# Patient Record
Sex: FEMALE
[De-identification: names, ages, dates, MRNs, and addresses within clinical notes are randomized per-mention and may not be internally consistent; named-entity substitution may affect disease eponyms.]

---

## 2020-04-26 ENCOUNTER — MESSAGE (OUTPATIENT)
Age: 25
End: 2020-04-26

## 2020-05-02 LAB
SARS-COV-2 IGG SERPL IA-ACNC: <0.1 INDEX
SARS-COV-2 IGG SERPL QL IA: NEGATIVE

## 2020-05-08 ENCOUNTER — APPOINTMENT (OUTPATIENT)
Dept: DISASTER EMERGENCY | Facility: HOSPITAL | Age: 25
End: 2020-05-08

## 2021-05-30 ENCOUNTER — TRANSCRIPTION ENCOUNTER (OUTPATIENT)
Age: 26
End: 2021-05-30

## 2021-06-12 ENCOUNTER — TRANSCRIPTION ENCOUNTER (OUTPATIENT)
Age: 26
End: 2021-06-12

## 2024-05-10 PROBLEM — Z00.00 ENCOUNTER FOR PREVENTIVE HEALTH EXAMINATION: Status: ACTIVE | Noted: 2024-05-10

## 2024-05-30 ENCOUNTER — APPOINTMENT (OUTPATIENT)
Dept: OBGYN | Facility: CLINIC | Age: 29
End: 2024-05-30
Payer: COMMERCIAL

## 2024-05-30 ENCOUNTER — RESULT REVIEW (OUTPATIENT)
Age: 29
End: 2024-05-30

## 2024-05-30 PROCEDURE — 96127 BRIEF EMOTIONAL/BEHAV ASSMT: CPT

## 2024-05-30 PROCEDURE — 36415 COLL VENOUS BLD VENIPUNCTURE: CPT

## 2024-05-30 PROCEDURE — 99204 OFFICE O/P NEW MOD 45 MIN: CPT | Mod: 25

## 2024-05-30 PROCEDURE — 76817 TRANSVAGINAL US OBSTETRIC: CPT

## 2024-06-17 ENCOUNTER — APPOINTMENT (OUTPATIENT)
Dept: OBGYN | Facility: CLINIC | Age: 29
End: 2024-06-17

## 2024-06-18 ENCOUNTER — APPOINTMENT (OUTPATIENT)
Dept: OBGYN | Facility: CLINIC | Age: 29
End: 2024-06-18
Payer: COMMERCIAL

## 2024-06-18 PROCEDURE — 0502F SUBSEQUENT PRENATAL CARE: CPT

## 2024-06-18 PROCEDURE — 76817 TRANSVAGINAL US OBSTETRIC: CPT

## 2024-06-18 PROCEDURE — 36415 COLL VENOUS BLD VENIPUNCTURE: CPT

## 2024-06-19 ENCOUNTER — TRANSCRIPTION ENCOUNTER (OUTPATIENT)
Age: 29
End: 2024-06-19

## 2024-07-03 ENCOUNTER — APPOINTMENT (OUTPATIENT)
Dept: OBGYN | Facility: CLINIC | Age: 29
End: 2024-07-03

## 2024-07-09 ENCOUNTER — APPOINTMENT (OUTPATIENT)
Dept: OBGYN | Facility: CLINIC | Age: 29
End: 2024-07-09
Payer: COMMERCIAL

## 2024-07-09 PROCEDURE — 76813 OB US NUCHAL MEAS 1 GEST: CPT

## 2024-07-09 PROCEDURE — 0502F SUBSEQUENT PRENATAL CARE: CPT

## 2024-07-09 PROCEDURE — 36415 COLL VENOUS BLD VENIPUNCTURE: CPT

## 2024-08-01 ENCOUNTER — APPOINTMENT (OUTPATIENT)
Dept: OBGYN | Facility: CLINIC | Age: 29
End: 2024-08-01
Payer: COMMERCIAL

## 2024-08-01 PROCEDURE — 0502F SUBSEQUENT PRENATAL CARE: CPT

## 2024-08-19 ENCOUNTER — APPOINTMENT (OUTPATIENT)
Dept: ANTEPARTUM | Facility: CLINIC | Age: 29
End: 2024-08-19

## 2024-08-19 ENCOUNTER — ASOB RESULT (OUTPATIENT)
Age: 29
End: 2024-08-19

## 2024-08-19 PROCEDURE — 76811 OB US DETAILED SNGL FETUS: CPT

## 2024-08-21 ENCOUNTER — APPOINTMENT (OUTPATIENT)
Dept: OBGYN | Facility: CLINIC | Age: 29
End: 2024-08-21
Payer: COMMERCIAL

## 2024-08-21 PROCEDURE — 0502F SUBSEQUENT PRENATAL CARE: CPT

## 2024-10-02 ENCOUNTER — APPOINTMENT (OUTPATIENT)
Dept: OBGYN | Facility: CLINIC | Age: 29
End: 2024-10-02
Payer: COMMERCIAL

## 2024-10-02 PROCEDURE — 0502F SUBSEQUENT PRENATAL CARE: CPT

## 2024-10-24 ENCOUNTER — APPOINTMENT (OUTPATIENT)
Dept: OBGYN | Facility: CLINIC | Age: 29
End: 2024-10-24
Payer: COMMERCIAL

## 2024-10-24 PROCEDURE — 76819 FETAL BIOPHYS PROFIL W/O NST: CPT | Mod: 59

## 2024-10-24 PROCEDURE — 0502F SUBSEQUENT PRENATAL CARE: CPT

## 2024-10-24 PROCEDURE — 90715 TDAP VACCINE 7 YRS/> IM: CPT

## 2024-10-24 PROCEDURE — 36415 COLL VENOUS BLD VENIPUNCTURE: CPT

## 2024-10-24 PROCEDURE — 76816 OB US FOLLOW-UP PER FETUS: CPT

## 2024-10-24 PROCEDURE — 90471 IMMUNIZATION ADMIN: CPT

## 2024-11-21 ENCOUNTER — APPOINTMENT (OUTPATIENT)
Dept: OBGYN | Facility: CLINIC | Age: 29
End: 2024-11-21
Payer: COMMERCIAL

## 2024-11-21 PROCEDURE — 0502F SUBSEQUENT PRENATAL CARE: CPT

## 2024-11-21 PROCEDURE — 76819 FETAL BIOPHYS PROFIL W/O NST: CPT | Mod: 59

## 2024-11-21 PROCEDURE — 76816 OB US FOLLOW-UP PER FETUS: CPT

## 2024-12-05 ENCOUNTER — APPOINTMENT (OUTPATIENT)
Dept: OBGYN | Facility: CLINIC | Age: 29
End: 2024-12-05
Payer: COMMERCIAL

## 2024-12-05 PROCEDURE — 0502F SUBSEQUENT PRENATAL CARE: CPT

## 2024-12-18 ENCOUNTER — APPOINTMENT (OUTPATIENT)
Dept: OBGYN | Facility: CLINIC | Age: 29
End: 2024-12-18
Payer: COMMERCIAL

## 2024-12-18 PROCEDURE — 0502F SUBSEQUENT PRENATAL CARE: CPT

## 2024-12-18 PROCEDURE — 90678 RSV VACC PREF BIVALENT IM: CPT

## 2024-12-18 PROCEDURE — 90471 IMMUNIZATION ADMIN: CPT

## 2024-12-30 ENCOUNTER — APPOINTMENT (OUTPATIENT)
Dept: OBGYN | Facility: CLINIC | Age: 29
End: 2024-12-30
Payer: COMMERCIAL

## 2024-12-30 PROCEDURE — 0502F SUBSEQUENT PRENATAL CARE: CPT

## 2024-12-30 PROCEDURE — 76816 OB US FOLLOW-UP PER FETUS: CPT

## 2024-12-30 PROCEDURE — 76819 FETAL BIOPHYS PROFIL W/O NST: CPT | Mod: 59

## 2025-01-07 ENCOUNTER — APPOINTMENT (OUTPATIENT)
Dept: OBGYN | Facility: CLINIC | Age: 30
End: 2025-01-07
Payer: COMMERCIAL

## 2025-01-07 PROCEDURE — 0502F SUBSEQUENT PRENATAL CARE: CPT

## 2025-01-08 ENCOUNTER — INPATIENT (INPATIENT)
Facility: HOSPITAL | Age: 30
LOS: 3 days | Discharge: HOME | DRG: 951 | End: 2025-01-12
Attending: OBSTETRICS & GYNECOLOGY | Admitting: OBSTETRICS & GYNECOLOGY
Payer: COMMERCIAL

## 2025-01-08 VITALS
WEIGHT: 121.92 LBS | HEIGHT: 63 IN | DIASTOLIC BLOOD PRESSURE: 75 MMHG | HEART RATE: 99 BPM | SYSTOLIC BLOOD PRESSURE: 129 MMHG | OXYGEN SATURATION: 96 % | TEMPERATURE: 98 F | RESPIRATION RATE: 17 BRPM

## 2025-01-08 DIAGNOSIS — Z33.1 PREGNANT STATE, INCIDENTAL: ICD-10-CM

## 2025-01-08 DIAGNOSIS — Z3A.39 39 WEEKS GESTATION OF PREGNANCY: ICD-10-CM

## 2025-01-08 LAB
BASOPHILS # BLD AUTO: 0.02 K/UL — SIGNIFICANT CHANGE UP (ref 0–0.2)
BASOPHILS NFR BLD AUTO: 0.2 % — SIGNIFICANT CHANGE UP (ref 0–2)
BLD GP AB SCN SERPL QL: NEGATIVE — SIGNIFICANT CHANGE UP
EOSINOPHIL # BLD AUTO: 0.05 K/UL — SIGNIFICANT CHANGE UP (ref 0–0.5)
EOSINOPHIL NFR BLD AUTO: 0.6 % — SIGNIFICANT CHANGE UP (ref 0–6)
HCT VFR BLD CALC: 37.7 % — SIGNIFICANT CHANGE UP (ref 34.5–45)
HGB BLD-MCNC: 12.6 G/DL — SIGNIFICANT CHANGE UP (ref 11.5–15.5)
IMM GRANULOCYTES NFR BLD AUTO: 0.6 % — SIGNIFICANT CHANGE UP (ref 0–0.9)
LYMPHOCYTES # BLD AUTO: 1.51 K/UL — SIGNIFICANT CHANGE UP (ref 1–3.3)
LYMPHOCYTES # BLD AUTO: 18.6 % — SIGNIFICANT CHANGE UP (ref 13–44)
MCHC RBC-ENTMCNC: 31.2 PG — SIGNIFICANT CHANGE UP (ref 27–34)
MCHC RBC-ENTMCNC: 33.4 G/DL — SIGNIFICANT CHANGE UP (ref 32–36)
MCV RBC AUTO: 93.3 FL — SIGNIFICANT CHANGE UP (ref 80–100)
MONOCYTES # BLD AUTO: 0.72 K/UL — SIGNIFICANT CHANGE UP (ref 0–0.9)
MONOCYTES NFR BLD AUTO: 8.9 % — SIGNIFICANT CHANGE UP (ref 2–14)
NEUTROPHILS # BLD AUTO: 5.78 K/UL — SIGNIFICANT CHANGE UP (ref 1.8–7.4)
NEUTROPHILS NFR BLD AUTO: 71.1 % — SIGNIFICANT CHANGE UP (ref 43–77)
NRBC # BLD: 0 /100 WBCS — SIGNIFICANT CHANGE UP (ref 0–0)
PLATELET # BLD AUTO: 167 K/UL — SIGNIFICANT CHANGE UP (ref 150–400)
RBC # BLD: 4.04 M/UL — SIGNIFICANT CHANGE UP (ref 3.8–5.2)
RBC # FLD: 13.4 % — SIGNIFICANT CHANGE UP (ref 10.3–14.5)
RH IG SCN BLD-IMP: POSITIVE — SIGNIFICANT CHANGE UP
RH IG SCN BLD-IMP: POSITIVE — SIGNIFICANT CHANGE UP
WBC # BLD: 8.13 K/UL — SIGNIFICANT CHANGE UP (ref 3.8–10.5)
WBC # FLD AUTO: 8.13 K/UL — SIGNIFICANT CHANGE UP (ref 3.8–10.5)

## 2025-01-08 RX ORDER — CHLORHEXIDINE GLUCONATE 1.2 MG/ML
1 RINSE ORAL DAILY
Refills: 0 | Status: DISCONTINUED | OUTPATIENT
Start: 2025-01-08 | End: 2025-01-10

## 2025-01-08 RX ORDER — PNV/FERROUS FUM/DOCUSATE/FOLIC 90-50-1MG
1 TABLET, EXTENDED RELEASE ORAL
Refills: 0 | DISCHARGE

## 2025-01-08 RX ORDER — OXYTOCIN IN 5 % DEXTROSE 20/500ML
167 PLASTIC BAG, INJECTION (ML) INTRAVENOUS
Qty: 30 | Refills: 0 | Status: DISCONTINUED | OUTPATIENT
Start: 2025-01-08 | End: 2025-01-12

## 2025-01-08 RX ORDER — SODIUM CHLORIDE 9 MG/ML
1000 INJECTION, SOLUTION INTRAVENOUS
Refills: 0 | Status: DISCONTINUED | OUTPATIENT
Start: 2025-01-08 | End: 2025-01-10

## 2025-01-08 RX ORDER — CITRIC ACID/SODIUM CITRATE 334-500MG
15 SOLUTION, ORAL ORAL EVERY 6 HOURS
Refills: 0 | Status: DISCONTINUED | OUTPATIENT
Start: 2025-01-08 | End: 2025-01-10

## 2025-01-08 NOTE — OB PROVIDER H&P - HISTORY OF PRESENT ILLNESS
PA Admission H&P    Subjective  HPI: yoF GP gestational age presents for _. +FM. -LOF. -CTXs. -VB. Pt denies any other concerns.    – PNC: Denies prenatal issues. GBS _.  EFW _g by sono.  – OBHx:   – GynHx: denies  – PMH: denies  – PSH: denies  – Psych: denies   – Social: denies   – Meds: PNV   – Allergies: NKDA  – Will accept blood transfusions? Yes    Objective  – VS  T(C): 36.8 (01-08-25 @ 18:41)  HR: 87 (01-08-25 @ 19:10)  BP: 129/75 (01-08-25 @ 18:41)  RR: 17 (01-08-25 @ 18:41)  SpO2: 93% (01-08-25 @ 19:10)  – PE:   CV: RRR  Pulm: breathing comfortably on RA  Abd: gravid, nontender  Extr: moving all extremities with ease  – FS:   – Spec: pooling, nitrazine, ferning, bleeding,  (lesions if patient with HSV2 history)  – VE: //  – FHT: baseline 1, mod variability, +accels, -decels  – [place]: qmin  – EFW: _g by sono  – Sono: vertex    Assessment  yoF GP gestational age presents for _.     Plan  1. Admit to LND. Routine Labs. IVF.  2. Expectant management/IOL w/  3. Fetus: cat 1 tracing. VTX. EFW _g by sono. Continuous EFM. Sono. No concerns.  4. Prenatal issues: none  5. GBS _  6. Pain: IV pain meds/epidural PRN    Plan per Dr. Alma Palma PAAlemC PA Admission H&P    Subjective  HPI: 29F  39.4wks gestational age presents for a scheduled eIOL. +FM. -LOF. -CTXs. -VB. Pt denies chest pain, palpitations, SOB, or any other concerns.    – PNC: Denies prenatal issues. GBS negative. EFW 3650g extrapolated from sono in office 1 week ago.   – OBHx: Primigravida. Denies terminations, miscarriages, ectopic pregnancies.  – GynHx: Denies fibroids, ovarian cysts, abnormal pap smears, STI  – PMH: Denies asthma, thyroid disorders, renal/liver disease, bleeding/clotting disorders  – PSH: Denies  – Psych: Denies anxiety, depression  – Social: Denies T/E/D  – Meds: PNV   – Allergies: NKDA  – Will accept blood transfusions? Yes

## 2025-01-08 NOTE — OB PROVIDER H&P - NSHPPHYSICALEXAM_GEN_ALL_CORE
Objective  – VS  T(C): 36.8 (01-08-25 @ 18:41)  HR: 87 (01-08-25 @ 19:10)  BP: 129/75 (01-08-25 @ 18:41)  RR: 17 (01-08-25 @ 18:41)  SpO2: 93% (01-08-25 @ 19:10)  – PE:   General: NAD  CV: RRR  Pulm: breathing comfortably on RA, clear to auscultation b/l  Abd: gravid, nontender  Extr: moving all extremities with ease, nonedematous, non-TTP of b/l LE  – VE: 1/50/-3  – FHT: 135/moderate variability/+accels/-decels  – [place]: infrequent contractions  – EFW: 3650g extrapolated from sono in office 1 week ago  – Sono: vertex

## 2025-01-08 NOTE — OB RN PATIENT PROFILE - NS_ISOLATION_OBGYN_ALL_OB
Chonodrocutaneous Helical Advancement Flap Text: The defect edges were debeveled with a #15 scalpel blade.  Given the location of the defect and the proximity to free margins a chondrocutaneous helical advancement flap was deemed most appropriate.  Using a sterile surgical marker, the appropriate advancement flap was drawn incorporating the defect and placing the expected incisions within the relaxed skin tension lines where possible.    The area thus outlined was incised deep to adipose tissue with a #15 scalpel blade.  The skin margins were undermined to an appropriate distance in all directions utilizing iris scissors. None

## 2025-01-08 NOTE — OB PROVIDER H&P - NS_OBGYNHISTORY_OBGYN_ALL_OB_FT
denies – PNC: Denies prenatal issues. GBS negative. EFW 3650g extrapolated from sono in office 1 week ago.   – OBHx: Primigravida. Denies terminations, miscarriages, ectopic pregnancies.  – GynHx: Denies fibroids, ovarian cysts, abnormal pap smears, STI

## 2025-01-08 NOTE — OB PROVIDER H&P - NSLOWPPHRISK_OBGYN_A_OB
No previous uterine incision/[place] Pregnancy/Less than or equal to 4 previous vaginal births/No known bleeding disorder

## 2025-01-08 NOTE — OB PROVIDER H&P - PROBLEM SELECTOR PLAN 1
Plan  1. Admit to L&D. Routine Labs. IVF.  2. IOL with PO cytotec and cervical balloon  3. Fetus: cat 1 tracing. VTX. EFW 3650g extrapolated from sono in office 1 week ago. Continuous EFM. Sono. No concerns.  4. Prenatal issues: none  5. GBS negative  6. Pain: IV pain meds/epidural PRN    Plan per Dr. Alma Palma PA-C

## 2025-01-09 ENCOUNTER — TRANSCRIPTION ENCOUNTER (OUTPATIENT)
Age: 30
End: 2025-01-09

## 2025-01-09 LAB — T PALLIDUM AB TITR SER: NEGATIVE — SIGNIFICANT CHANGE UP

## 2025-01-09 PROCEDURE — 59510 CESAREAN DELIVERY: CPT

## 2025-01-09 DEVICE — INTERCEED 3 X 4": Type: IMPLANTABLE DEVICE | Status: FUNCTIONAL

## 2025-01-09 RX ORDER — METHYLERGONOVINE MALEATE 0.2 MG/1
0.2 TABLET ORAL EVERY 4 HOURS
Refills: 0 | Status: COMPLETED | OUTPATIENT
Start: 2025-01-09 | End: 2025-01-10

## 2025-01-09 RX ORDER — OXYCODONE HCL 15 MG
5 TABLET ORAL
Refills: 0 | Status: DISCONTINUED | OUTPATIENT
Start: 2025-01-10 | End: 2025-01-12

## 2025-01-09 RX ORDER — PIPERACILLIN AND TAZOBACTAM 3; .375 G/15ML; G/15ML
4.5 INJECTION, POWDER, LYOPHILIZED, FOR SOLUTION INTRAVENOUS EVERY 8 HOURS
Refills: 0 | Status: COMPLETED | OUTPATIENT
Start: 2025-01-09 | End: 2025-01-10

## 2025-01-09 RX ORDER — OXYCODONE HCL 15 MG
10 TABLET ORAL
Refills: 0 | Status: DISCONTINUED | OUTPATIENT
Start: 2025-01-10 | End: 2025-01-10

## 2025-01-09 RX ORDER — HEPARIN SODIUM 1000 [USP'U]/ML
5000 INJECTION, SOLUTION INTRAVENOUS; SUBCUTANEOUS EVERY 12 HOURS
Refills: 0 | Status: DISCONTINUED | OUTPATIENT
Start: 2025-01-10 | End: 2025-01-12

## 2025-01-09 RX ORDER — SODIUM CHLORIDE 9 MG/ML
1000 INJECTION, SOLUTION INTRAVENOUS
Refills: 0 | Status: DISCONTINUED | OUTPATIENT
Start: 2025-01-10 | End: 2025-01-12

## 2025-01-09 RX ORDER — LANOLIN
1 OINTMENT (GRAM) TOPICAL EVERY 6 HOURS
Refills: 0 | Status: DISCONTINUED | OUTPATIENT
Start: 2025-01-10 | End: 2025-01-12

## 2025-01-09 RX ORDER — ACETAMINOPHEN 80 MG/.8ML
975 SOLUTION/ DROPS ORAL
Refills: 0 | Status: DISCONTINUED | OUTPATIENT
Start: 2025-01-10 | End: 2025-01-12

## 2025-01-09 RX ORDER — DIPHENHYDRAMINE HCL 25 MG
25 TABLET ORAL EVERY 6 HOURS
Refills: 0 | Status: DISCONTINUED | OUTPATIENT
Start: 2025-01-10 | End: 2025-01-12

## 2025-01-09 RX ORDER — SIMETHICONE 125 MG/1
80 CAPSULE, LIQUID FILLED ORAL EVERY 4 HOURS
Refills: 0 | Status: DISCONTINUED | OUTPATIENT
Start: 2025-01-10 | End: 2025-01-12

## 2025-01-09 RX ORDER — DIPHENOXYLATE HYDROCHLORIDE AND ATROPINE SULFATE 2.5; .025 MG/1; MG/1
2 TABLET ORAL ONCE
Refills: 0 | Status: DISCONTINUED | OUTPATIENT
Start: 2025-01-09 | End: 2025-01-09

## 2025-01-09 RX ORDER — DEXAMETHASONE SODIUM PHOSPHATE 4 MG/ML
4 VIAL (ML) INJECTION EVERY 6 HOURS
Refills: 0 | Status: DISCONTINUED | OUTPATIENT
Start: 2025-01-10 | End: 2025-01-10

## 2025-01-09 RX ORDER — NALOXONE HCL 0.4 MG/ML
0.1 VIAL (ML) INJECTION
Refills: 0 | Status: DISCONTINUED | OUTPATIENT
Start: 2025-01-10 | End: 2025-01-10

## 2025-01-09 RX ORDER — KETOROLAC TROMETHAMINE 30 MG/ML
30 INJECTION INTRAMUSCULAR; INTRAVENOUS EVERY 6 HOURS
Refills: 0 | Status: DISCONTINUED | OUTPATIENT
Start: 2025-01-10 | End: 2025-01-11

## 2025-01-09 RX ORDER — HYDROMORPHONE HCL 4 MG
0.25 TABLET ORAL
Refills: 0 | Status: DISCONTINUED | OUTPATIENT
Start: 2025-01-10 | End: 2025-01-12

## 2025-01-09 RX ORDER — OXYCODONE HCL 15 MG
5 TABLET ORAL
Refills: 0 | Status: DISCONTINUED | OUTPATIENT
Start: 2025-01-10 | End: 2025-01-10

## 2025-01-09 RX ORDER — OXYTOCIN IN 5 % DEXTROSE 20/500ML
4 PLASTIC BAG, INJECTION (ML) INTRAVENOUS
Qty: 30 | Refills: 0 | Status: DISCONTINUED | OUTPATIENT
Start: 2025-01-09 | End: 2025-01-12

## 2025-01-09 RX ORDER — OXYCODONE HCL 15 MG
5 TABLET ORAL ONCE
Refills: 0 | Status: DISCONTINUED | OUTPATIENT
Start: 2025-01-10 | End: 2025-01-12

## 2025-01-09 RX ORDER — ACETAMINOPHEN 80 MG/.8ML
1000 SOLUTION/ DROPS ORAL ONCE
Refills: 0 | Status: COMPLETED | OUTPATIENT
Start: 2025-01-09 | End: 2025-01-09

## 2025-01-09 RX ORDER — PIPERACILLIN AND TAZOBACTAM 3; .375 G/15ML; G/15ML
4.5 INJECTION, POWDER, LYOPHILIZED, FOR SOLUTION INTRAVENOUS ONCE
Refills: 0 | Status: COMPLETED | OUTPATIENT
Start: 2025-01-09 | End: 2025-01-09

## 2025-01-09 RX ORDER — NALBUPHINE HYDROCHLORIDE 20 MG/ML
2.5 INJECTION INTRAMUSCULAR; INTRAVENOUS; SUBCUTANEOUS EVERY 6 HOURS
Refills: 0 | Status: DISCONTINUED | OUTPATIENT
Start: 2025-01-10 | End: 2025-01-10

## 2025-01-09 RX ORDER — ONDANSETRON 4 MG/1
4 TABLET ORAL EVERY 6 HOURS
Refills: 0 | Status: DISCONTINUED | OUTPATIENT
Start: 2025-01-10 | End: 2025-01-10

## 2025-01-09 RX ORDER — OXYTOCIN IN 5 % DEXTROSE 20/500ML
42 PLASTIC BAG, INJECTION (ML) INTRAVENOUS
Qty: 30 | Refills: 0 | Status: DISCONTINUED | OUTPATIENT
Start: 2025-01-10 | End: 2025-01-12

## 2025-01-09 RX ORDER — CLOSTRIDIUM TETANI TOXOID ANTIGEN (FORMALDEHYDE INACTIVATED), CORYNEBACTERIUM DIPHTHERIAE TOXOID ANTIGEN (FORMALDEHYDE INACTIVATED), BORDETELLA PERTUSSIS TOXOID ANTIGEN (GLUTARALDEHYDE INACTIVATED), BORDETELLA PERTUSSIS FILAMENTOUS HEMAGGLUTININ ANTIGEN (FORMALDEHYDE INACTIVATED), BORDETELLA PERTUSSIS PERTACTIN ANTIGEN, AND BORDETELLA PERTUSSIS FIMBRIAE 2/3 ANTIGEN 5; 2; 2.5; 5; 3; 5 [LF]/.5ML; [LF]/.5ML; UG/.5ML; UG/.5ML; UG/.5ML; UG/.5ML
0.5 INJECTION, SUSPENSION INTRAMUSCULAR ONCE
Refills: 0 | Status: DISCONTINUED | OUTPATIENT
Start: 2025-01-10 | End: 2025-01-12

## 2025-01-09 RX ORDER — ONDANSETRON 4 MG/1
4 TABLET ORAL ONCE
Refills: 0 | Status: DISCONTINUED | OUTPATIENT
Start: 2025-01-10 | End: 2025-01-12

## 2025-01-09 RX ORDER — MORPHINE SULFATE 15 MG
0.1 TABLET, EXTENDED RELEASE ORAL ONCE
Refills: 0 | Status: DISCONTINUED | OUTPATIENT
Start: 2025-01-10 | End: 2025-01-10

## 2025-01-09 RX ORDER — IBUPROFEN 200 MG
600 TABLET ORAL EVERY 6 HOURS
Refills: 0 | Status: COMPLETED | OUTPATIENT
Start: 2025-01-10 | End: 2025-12-09

## 2025-01-09 RX ORDER — MAGNESIUM HYDROXIDE 400 MG/5ML
30 SUSPENSION, ORAL (FINAL DOSE FORM) ORAL
Refills: 0 | Status: DISCONTINUED | OUTPATIENT
Start: 2025-01-10 | End: 2025-01-12

## 2025-01-09 RX ADMIN — DIPHENOXYLATE HYDROCHLORIDE AND ATROPINE SULFATE 2 TABLET(S): 2.5; .025 TABLET ORAL at 23:32

## 2025-01-09 RX ADMIN — Medication 4 MILLIUNIT(S)/MIN: at 04:07

## 2025-01-09 RX ADMIN — PIPERACILLIN AND TAZOBACTAM 200 GRAM(S): 3; .375 INJECTION, POWDER, LYOPHILIZED, FOR SOLUTION INTRAVENOUS at 21:34

## 2025-01-09 RX ADMIN — ACETAMINOPHEN 1000 MILLIGRAM(S): 80 SOLUTION/ DROPS ORAL at 23:30

## 2025-01-09 RX ADMIN — ACETAMINOPHEN 400 MILLIGRAM(S): 80 SOLUTION/ DROPS ORAL at 21:15

## 2025-01-09 NOTE — OB RN INTRAOPERATIVE NOTE - NSSELHIDDEN_OBGYN_ALL_OB_FT
[NS_DeliveryAttending1_OBGYN_ALL_OB_FT:[id number]],[NS_DeliveryAssist1_OBGYN_ALL_OB_FT:[id number]],[NS_DeliveryRN_OBGYN_ALL_OB_FT:[id number]]

## 2025-01-09 NOTE — OB RN INTRAOPERATIVE NOTE - NS_NEWBORNRN1_OBGYN_ALL_OB_FT
Jordon White RN clear to auscultation bilaterally/respirations non-labored/breath sounds equal/good air movement/airway patent

## 2025-01-09 NOTE — OB PROVIDER LABOR PROGRESS NOTE - NSVAGINALEXAM_OBGYN_ALL_OB_DT
09-Jan-2025 18:15
09-Jan-2025 00:38
09-Jan-2025 07:36
09-Jan-2025 09:15
09-Jan-2025 17:02
09-Jan-2025 14:03
09-Jan-2025 19:41
09-Jan-2025 21:45

## 2025-01-09 NOTE — OB PROVIDER DELIVERY SUMMARY - NSPROVIDERDELIVERYNOTE_OBGYN_ALL_OB_FT
pLTCS for arrest of descent in the setting of category 2 tracing and chorioamnionitis  - Spinal done prior to  for appropriate analgesia     Liveborn female infant in OP presentation. APGARS 7/9  Grossly normal uterus, bilateral tubes, and ovaries  Hysterotomy closed in x2 layers w/ 1-0 PDS   - Right-sided extension noted  - Interceed placed over hysterotomy  Rectus muscle reapproximated w/ 2-0 Vicryl w/ horizontal mattress sutures   Fascia reapproximated w/ 0-0 Vicryl in a running fashion   SubQ reapproximated w/ 2-0 Vicryl in an interrupted fashion   Skin reapproximated w/ Quill  in a subcuticular fashion     969/1800/300  - Uterine atony addressed w/ IM Methergine and intrauterine Hemabate  - For x24hrs of Zosyn post-op and a Methergine series     Dictation #: pLTCS for category 2 tracing and chorioamnionitis  - Spinal done prior to  for appropriate analgesia     Liveborn female infant in OP presentation. APGARS 7/9  Grossly normal uterus, bilateral tubes, and ovaries  Hysterotomy closed in x2 layers w/ 1-0 PDS   - Right-sided extension noted  - Interceed placed over hysterotomy  Rectus muscle reapproximated w/ 2-0 Vicryl w/ horizontal mattress sutures   Fascia reapproximated w/ 0-0 Vicryl in a running fashion   SubQ reapproximated w/ 2-0 Vicryl in an interrupted fashion   Skin reapproximated w/ Quill  in a subcuticular fashion     969/1800/300  - Uterine atony addressed w/ IM Methergine and intrauterine Hemabate  - For x24hrs of Zosyn post-op and a Methergine series     Dictation #: pLTCS for category 2 tracing and chorioamnionitis  - Spinal done prior to  for appropriate analgesia     Liveborn female infant in OP presentation. APGARS 7/9  Grossly normal uterus, bilateral tubes, and ovaries  Hysterotomy closed in x2 layers w/ 1-0 PDS   - Right-sided extension noted  - Interceed placed over hysterotomy  Rectus muscle reapproximated w/ 2-0 Vicryl w/ horizontal mattress sutures   Fascia reapproximated w/ 0-0 Vicryl in a running fashion   SubQ reapproximated w/ 2-0 Vicryl in an interrupted fashion   Skin reapproximated w/ Quill  in a subcuticular fashion     969/1800/300  - Uterine atony addressed w/ IM Methergine and intrauterine Hemabate  - For x24hrs of Zosyn post-op and a Methergine series     Dictation #: [id number]

## 2025-01-09 NOTE — OB PROVIDER LABOR PROGRESS NOTE - NS_OBIHIFHRDETAILS_OBGYN_ALL_OB_FT
cat 1
cat 2 with fetal tachycardia and variable decels
CAT 1
140/mod/(-)accels/(-)decels
cat 1

## 2025-01-09 NOTE — OB RN INTRAOPERATIVE NOTE - NS_ADDITIONALPROCINFO1_OBGYN_ALL_OB_FT
QBL= 969ml as per triton in OR A  Urine= 300ml of bloody urine noted prior to leaving OR A    As per MD Keller, zosyn to continue for 24 hours and methergine series to be ordered. Monitor pt bleeding. No further interventions ordered. Nursing cares continue.

## 2025-01-09 NOTE — OB PROVIDER LABOR PROGRESS NOTE - ASSESSMENT
AROM clear  decrease pitocin --- tachysystole  kvng roberts md
To D/C Pitocin  If Ctx space out, to restart Pitocin  BOWI intact  Anticipate vaginal delivery    JHONY Kwan
Unchanged exam  Will try peanut ball again  Increase pitocin to 3u    alejandra levin
Plan to start pushing soon  Continue peanut ball    alejandra levin
A/P:   28yo  @ 39w5d presenting for elective induction     #Labor   - on PO Misoprostol  - Cervical ripening balloon placed. Patient tolerated the procedure well. Vaginal and uterine balloons were filled with 60cc of normal Saline each     #Fetal Wellbeing   - Cat 1    Teodoro Mcmanus, PGY-3    plan per Dr. Keller 
Continue pitocin  Patient to sit in high [place] roberts md
Patient with maternal fever of 38.5 and fetal tachycardia consistent with diagnosis of intramniotic infection/chorioamnionitis  Decision made to treat with zosyn and tylenol  Patient had been pushing for 1 hour with minimal descent of head  Given cat 2 tracing, chorioamnionitis, and minimal descent, decision made to proceed with primary   All rb/a discussed    alejandra levin
Increase pitocin  continue peanut ball  making slow progress    alejandra levin

## 2025-01-09 NOTE — OB PROVIDER DELIVERY SUMMARY - NSSELHIDDEN_OBGYN_ALL_OB_FT
[NS_DeliveryAttending1_OBGYN_ALL_OB_FT:[id number]],[NS_DeliveryAssist1_OBGYN_ALL_OB_FT:[id number]=]

## 2025-01-09 NOTE — OB PROVIDER LABOR PROGRESS NOTE - NS_STATION_OBGYN_ALL_OB_NU
-1
FREDDY completed. Pt reports that she does not drink \"daily\", but when she does, she consumes \"5-6 glasses of hard liquor\". Last drink was \"yesterday\", pt not able to specify time.   
-1
-2
0
-3
-3

## 2025-01-09 NOTE — OB NEONATOLOGY/PEDIATRICIAN DELIVERY SUMMARY - NSPEDSNEONOTESA_OBGYN_ALL_OB_FT
Called to OR for Cat 2 tracing failed IOL 39.5 wk female born via Primary CS on  at 2226     to a 28 y/o   blood type O+ mother. No significant maternal or prenatal history. PNL as follows: HIV -, Hep B - Hep C -, RPR NR, Rubella I, GBS - on  AROM at 0907 with clear fluid. Baby emerged  with pallor and poor respiratory effort was warmed, dried,deep suctioned with 10 [place]ish cath orally and nasally down left nares unable to pass down right nares given CPAP x 3 minutes  and stimulated with excellent response APGARS of 7/9. Mom plans to initiate breastfeeding. Consents to  Hep B vaccine   Highest maternal temp 38.5 received Zosyn at 2134.EOS 0.55. Called to OR for Cat 2 tracing failed IOL 39.5 wk female born via Primary CS on  at 2226     to a 32 y/o   blood type O+ mother. No significant maternal or prenatal history. PNL as follows: HIV -, Hep B - Hep C -, RPR NR, Rubella I, GBS - on  AROM at 0907 with clear fluid. Baby emerged  with pallor and poor respiratory effort was warmed, dried,deep suctioned with 10 Amharic cath orally and nasally down left nares unable to pass down right nares given CPAP x 3 minutes  and stimulated with excellent response APGARS of 7/9. Mom plans to initiate breastfeeding. Consents to  Hep B vaccine   Highest maternal temp 38.5 received Zosyn at 2134.EOS 1.57 Blood culture drawn cbcd will be sent at 6hol

## 2025-01-09 NOTE — OB PROVIDER LABOR PROGRESS NOTE - NS_SUBJECTIVE/OBJECTIVE_OBGYN_ALL_OB_FT
Labor & Delivery Progress Note     Pt examined @ 0038 for cervical balloon placement     T(C): 36.6 (25 @ 23:35), Max: 36.8 (25 @ 18:37)  HR: 59 (25 @ 00:50) (59 - 107)  BP: 119/70 (25 @ 00:16) (118/60 - 139/70)  RR: 17 (25 @ 23:09) (17 - 17)  SpO2: 94% (25 @ 00:50) (91% - 99%)      A/P:   29y G*P* at *** admitted for ***    #Labor   - s/p *** currently recieving ***for induction/augment     #Fetal Wellbeing   - Cat     # Issues     #Pain Control   - Epidural or IV PRN    Teodoro Mcmanus, PGY-2    d/w 
Pt examined for progress
Pt. comfortable on Pitocin @ 2 mu/min.
Fetus became tachycardic  Maternal temperature taken and it was found to be 38.5
Pt examined for progress

## 2025-01-09 NOTE — OB PROVIDER LABOR PROGRESS NOTE - NS_OBIHICONTRACTIONPATTERNDETAILS_OBGYN_ALL_OB_FT
every 2-3 min
q5min
2-3 min
every 2-3min
every 2 min
recent onsent of tachysystole

## 2025-01-10 LAB
ANISOCYTOSIS BLD QL: SLIGHT — SIGNIFICANT CHANGE UP
BASOPHILS # BLD AUTO: 0 K/UL — SIGNIFICANT CHANGE UP (ref 0–0.2)
BASOPHILS NFR BLD AUTO: 0 % — SIGNIFICANT CHANGE UP (ref 0–2)
DACRYOCYTES BLD QL SMEAR: SLIGHT — SIGNIFICANT CHANGE UP
EOSINOPHIL # BLD AUTO: 0 K/UL — SIGNIFICANT CHANGE UP (ref 0–0.5)
EOSINOPHIL NFR BLD AUTO: 0 % — SIGNIFICANT CHANGE UP (ref 0–6)
HCT VFR BLD CALC: 31.8 % — LOW (ref 34.5–45)
HGB BLD-MCNC: 10.4 G/DL — LOW (ref 11.5–15.5)
LYMPHOCYTES # BLD AUTO: 0.21 K/UL — LOW (ref 1–3.3)
LYMPHOCYTES # BLD AUTO: 0.9 % — LOW (ref 13–44)
MACROCYTES BLD QL: SLIGHT — SIGNIFICANT CHANGE UP
MANUAL SMEAR VERIFICATION: SIGNIFICANT CHANGE UP
MCHC RBC-ENTMCNC: 31 PG — SIGNIFICANT CHANGE UP (ref 27–34)
MCHC RBC-ENTMCNC: 32.7 G/DL — SIGNIFICANT CHANGE UP (ref 32–36)
MCV RBC AUTO: 94.6 FL — SIGNIFICANT CHANGE UP (ref 80–100)
METAMYELOCYTES # FLD: 4.4 % — HIGH (ref 0–0)
MONOCYTES # BLD AUTO: 1.02 K/UL — HIGH (ref 0–0.9)
MONOCYTES NFR BLD AUTO: 4.4 % — SIGNIFICANT CHANGE UP (ref 2–14)
NEUTROPHILS # BLD AUTO: 20.85 K/UL — HIGH (ref 1.8–7.4)
NEUTROPHILS NFR BLD AUTO: 73.5 % — SIGNIFICANT CHANGE UP (ref 43–77)
NEUTS BAND # BLD: 16.8 % — HIGH (ref 0–8)
OVALOCYTES BLD QL SMEAR: SLIGHT — SIGNIFICANT CHANGE UP
PLAT MORPH BLD: ABNORMAL
PLATELET # BLD AUTO: 124 K/UL — LOW (ref 150–400)
POIKILOCYTOSIS BLD QL AUTO: SLIGHT — SIGNIFICANT CHANGE UP
POLYCHROMASIA BLD QL SMEAR: SLIGHT — SIGNIFICANT CHANGE UP
RBC # BLD: 3.36 M/UL — LOW (ref 3.8–5.2)
RBC # FLD: 13.5 % — SIGNIFICANT CHANGE UP (ref 10.3–14.5)
RBC BLD AUTO: ABNORMAL
WBC # BLD: 23.09 K/UL — HIGH (ref 3.8–10.5)
WBC # FLD AUTO: 23.09 K/UL — HIGH (ref 3.8–10.5)

## 2025-01-10 RX ADMIN — ACETAMINOPHEN 975 MILLIGRAM(S): 80 SOLUTION/ DROPS ORAL at 20:49

## 2025-01-10 RX ADMIN — ACETAMINOPHEN 975 MILLIGRAM(S): 80 SOLUTION/ DROPS ORAL at 03:15

## 2025-01-10 RX ADMIN — HEPARIN SODIUM 5000 UNIT(S): 1000 INJECTION, SOLUTION INTRAVENOUS; SUBCUTANEOUS at 18:00

## 2025-01-10 RX ADMIN — ACETAMINOPHEN 975 MILLIGRAM(S): 80 SOLUTION/ DROPS ORAL at 21:25

## 2025-01-10 RX ADMIN — ACETAMINOPHEN 975 MILLIGRAM(S): 80 SOLUTION/ DROPS ORAL at 15:30

## 2025-01-10 RX ADMIN — ACETAMINOPHEN 975 MILLIGRAM(S): 80 SOLUTION/ DROPS ORAL at 09:22

## 2025-01-10 RX ADMIN — METHYLERGONOVINE MALEATE 0.2 MILLIGRAM(S): 0.2 TABLET ORAL at 02:21

## 2025-01-10 RX ADMIN — ACETAMINOPHEN 975 MILLIGRAM(S): 80 SOLUTION/ DROPS ORAL at 04:15

## 2025-01-10 RX ADMIN — KETOROLAC TROMETHAMINE 30 MILLIGRAM(S): 30 INJECTION INTRAMUSCULAR; INTRAVENOUS at 06:34

## 2025-01-10 RX ADMIN — METHYLERGONOVINE MALEATE 0.2 MILLIGRAM(S): 0.2 TABLET ORAL at 10:23

## 2025-01-10 RX ADMIN — HEPARIN SODIUM 5000 UNIT(S): 1000 INJECTION, SOLUTION INTRAVENOUS; SUBCUTANEOUS at 06:35

## 2025-01-10 RX ADMIN — KETOROLAC TROMETHAMINE 30 MILLIGRAM(S): 30 INJECTION INTRAMUSCULAR; INTRAVENOUS at 13:30

## 2025-01-10 RX ADMIN — PIPERACILLIN AND TAZOBACTAM 200 GRAM(S): 3; .375 INJECTION, POWDER, LYOPHILIZED, FOR SOLUTION INTRAVENOUS at 06:35

## 2025-01-10 RX ADMIN — PIPERACILLIN AND TAZOBACTAM 200 GRAM(S): 3; .375 INJECTION, POWDER, LYOPHILIZED, FOR SOLUTION INTRAVENOUS at 21:50

## 2025-01-10 RX ADMIN — METHYLERGONOVINE MALEATE 0.2 MILLIGRAM(S): 0.2 TABLET ORAL at 14:35

## 2025-01-10 RX ADMIN — METHYLERGONOVINE MALEATE 0.2 MILLIGRAM(S): 0.2 TABLET ORAL at 06:34

## 2025-01-10 RX ADMIN — KETOROLAC TROMETHAMINE 30 MILLIGRAM(S): 30 INJECTION INTRAMUSCULAR; INTRAVENOUS at 18:00

## 2025-01-10 RX ADMIN — KETOROLAC TROMETHAMINE 30 MILLIGRAM(S): 30 INJECTION INTRAMUSCULAR; INTRAVENOUS at 12:31

## 2025-01-10 RX ADMIN — ACETAMINOPHEN 975 MILLIGRAM(S): 80 SOLUTION/ DROPS ORAL at 10:00

## 2025-01-10 RX ADMIN — PIPERACILLIN AND TAZOBACTAM 200 GRAM(S): 3; .375 INJECTION, POWDER, LYOPHILIZED, FOR SOLUTION INTRAVENOUS at 14:35

## 2025-01-10 RX ADMIN — METHYLERGONOVINE MALEATE 0.2 MILLIGRAM(S): 0.2 TABLET ORAL at 18:00

## 2025-01-10 RX ADMIN — ACETAMINOPHEN 975 MILLIGRAM(S): 80 SOLUTION/ DROPS ORAL at 14:34

## 2025-01-10 NOTE — PROGRESS NOTE ADULT - SUBJECTIVE AND OBJECTIVE BOX
Postpartum Note-  Section POD#1    Allergies    No Known Allergies    Intolerances         RPR:  negative              Blood Type: O positive    S:Patient is a  29y P1 now POD#1 s/p pLCTS for arrest of descent, c/b chorioamnionitis   Patient w/o complaints, pain is controlled.  Pt is OOB, tolerating PO, passing flatus. Lochia WNL. denies fever/chills.   Feeding: breastfeeding     O:  Vital Signs Last 24 Hrs  T(C): 36.5 (10 Abiel 2025 06:09), Max: 38.5 (2025 21:07)  T(F): 97.7 (10 Abiel 2025 06:09), Max: 101.3 (2025 21:07)  HR: 61 (10 Abiel 2025 02:36) (59 - 129)  BP: 121/56 (10 Abiel 2025 02:36) (102/59 - 142/66)  BP(mean): 80 (10 Abiel 2025 01:25) (76 - 95)  RR: 18 (10 Abiel 2025 06:09) (17 - 18)  SpO2: 96% (10 Abiel 2025 06:09) (81% - 100%)    Parameters below as of 10 Abiel 2025 06:09  Patient On (Oxygen Delivery Method): room air      I&O's Summary    2025 07:01  -  10 Abiel 2025 07:00  --------------------------------------------------------  IN: 2350 mL / OUT: 3869 mL / NET: -1519 mL        Gen: NAD  CV: rrr s1s2, CTABL  Abdomen: Soft, nontender, non-distended, fundus firm.  Bowel sounds x 4 quadrants  Incision: Clean, dry, and intact.  Negative erythema/edema/ecchymosis   Sub Q  Lochia WNL  Ext: PAS in place. Negative Homans B/L.  Pedal pulses palpated B/L    LABS:                          10.4   23.09 )-----------( 124      ( 10 Abiel 2025 06:59 )             31.8

## 2025-01-10 NOTE — PROGRESS NOTE ADULT - SUBJECTIVE AND OBJECTIVE BOX
POD#1  Patient seen and examined at bedside, no acute overnight events. No acute complaints, pain well controlled.  Patient is ambulating and tolerating regular diet. Has not yet passed flatus. Brown recently removed and she is awaiting void. Pt is breast and bottle feeding her baby.    Vital Signs Last 24 Hours  T(C): 36.5 (01-10-25 @ 06:09), Max: 38.5 (01-09-25 @ 21:07)  HR: 61 (01-10-25 @ 02:36) (59 - 129)  BP: 121/56 (01-10-25 @ 02:36) (102/59 - 142/66)  RR: 18 (01-10-25 @ 06:09) (17 - 18)  SpO2: 96% (01-10-25 @ 06:09) (81% - 100%)    I&O's Summary    09 Jan 2025 07:01  -  10 Abiel 2025 07:00  --------------------------------------------------------  IN: 2350 mL / OUT: 3869 mL / NET: -1519 mL        Physical Exam:  General: NAD  Abdomen: Soft, non-tender, non-distended, fundus firm  Incision: Pfannenstiel incision CDI, subcuticular suture closure  Pelvic: Lochia wnl  Ext: NTBL  Labs:  Blood type: O Positive  Rubella IgG: RPR: Negative                          10.4[L]   23.09[H] >-----------< 124[L]    ( 01-10 @ 06:59 )             31.8[L]                        12.6   8.13 >-----------< 167    ( 01-08 @ 19:29 )             37.7                  MEDICATIONS  (STANDING):  acetaminophen     Tablet .. 975 milliGRAM(s) Oral <User Schedule>  diphtheria/tetanus/pertussis (acellular) Vaccine (Adacel) 0.5 milliLiter(s) IntraMuscular once  heparin   Injectable 5000 Unit(s) SubCutaneous every 12 hours  ibuprofen  Tablet. 600 milliGRAM(s) Oral every 6 hours  ketorolac   Injectable 30 milliGRAM(s) IV Push every 6 hours  lactated ringers. 1000 milliLiter(s) (125 mL/Hr) IV Continuous <Continuous>  methylergonovine 0.2 milliGRAM(s) Oral every 4 hours  morphine PF Spinal 0.1 milliGRAM(s) IntraThecal. once  oxytocin Infusion 167 milliUNIT(s)/Min (167 mL/Hr) IV Continuous <Continuous>  oxytocin Infusion 42 milliUNIT(s)/Min (42 mL/Hr) IV Continuous <Continuous>  oxytocin Infusion. 4 milliUNIT(s)/Min (4 mL/Hr) IV Continuous <Continuous>  piperacillin/tazobactam IVPB.. 4.5 Gram(s) IV Intermittent every 8 hours    MEDICATIONS  (PRN):  dexAMETHasone  Injectable 4 milliGRAM(s) IV Push every 6 hours PRN Nausea  diphenhydrAMINE 25 milliGRAM(s) Oral every 6 hours PRN Pruritus  HYDROmorphone  Injectable 0.25 milliGRAM(s) IV Push every 10 minutes PRN Severe Pain (7 - 10)  lanolin Ointment 1 Application(s) Topical every 6 hours PRN Sore Nipples  magnesium hydroxide Suspension 30 milliLiter(s) Oral two times a day PRN Constipation  nalbuphine Injectable 2.5 milliGRAM(s) IV Push every 6 hours PRN Pruritus  naloxone Injectable 0.1 milliGRAM(s) IV Push every 3 minutes PRN For ANY of the following changes in patient status:  A. Breaths Per Minute LESS THAN 10, B. Oxygen saturation LESS THAN 90%, C. Sedation score of 6 for Stop After: 4 Times  ondansetron Injectable 4 milliGRAM(s) IV Push once PRN Nausea and/or Vomiting  ondansetron Injectable 4 milliGRAM(s) IV Push every 6 hours PRN Nausea  oxyCODONE    IR 5 milliGRAM(s) Oral every 3 hours PRN Moderate to Severe Pain (4-10)  oxyCODONE    IR 5 milliGRAM(s) Oral once PRN Moderate to Severe Pain (4-10)  oxyCODONE    IR 5 milliGRAM(s) Oral every 3 hours PRN Moderate Pain (4 - 6)  oxyCODONE    IR 10 milliGRAM(s) Oral every 3 hours PRN Severe Pain (7 - 10)  simethicone 80 milliGRAM(s) Chew every 4 hours PRN Gas

## 2025-01-10 NOTE — OB RN DELIVERY SUMMARY - NS_SEPSISRSKCALC_OBGYN_ALL_OB_FT
EOS calculated successfully. EOS Risk Factor: 0.5/1000 live births ([facility] [place] national incidence); GA=39w5d; Temp=100.04; ROM=13.317; GBS='Negative'; Antibiotics='No antibiotics or any antibiotics < 2 hrs prior to birth'

## 2025-01-10 NOTE — PROGRESS NOTE ADULT - ASSESSMENT
29 POD#1 from PCS for cat II/chorio doing well after delivery    - Continue with po analgesia  - Increase ambulation  - Continue regular diet  - d/c IV fluids  - Check CBC  - DTV  - Incision dressing removed    Keyla Ambriz

## 2025-01-10 NOTE — PROGRESS NOTE ADULT - SUBJECTIVE AND OBJECTIVE BOX
Day 1 of Anesthesia Pain Management Service    SUBJECTIVE: Doing ok, a little soreness  Pain Scale Score:          [X] Refer to charted pain scores    THERAPY:    s/p    100 mcg PF morphine on 1\9\2025       MEDICATIONS  (STANDING):  acetaminophen     Tablet .. 975 milliGRAM(s) Oral <User Schedule>  diphtheria/tetanus/pertussis (acellular) Vaccine (Adacel) 0.5 milliLiter(s) IntraMuscular once  heparin   Injectable 5000 Unit(s) SubCutaneous every 12 hours  ibuprofen  Tablet. 600 milliGRAM(s) Oral every 6 hours  ketorolac   Injectable 30 milliGRAM(s) IV Push every 6 hours  lactated ringers. 1000 milliLiter(s) (125 mL/Hr) IV Continuous <Continuous>  methylergonovine 0.2 milliGRAM(s) Oral every 4 hours  morphine PF Spinal 0.1 milliGRAM(s) IntraThecal. once  oxytocin Infusion 167 milliUNIT(s)/Min (167 mL/Hr) IV Continuous <Continuous>  oxytocin Infusion 42 milliUNIT(s)/Min (42 mL/Hr) IV Continuous <Continuous>  oxytocin Infusion. 4 milliUNIT(s)/Min (4 mL/Hr) IV Continuous <Continuous>  piperacillin/tazobactam IVPB.. 4.5 Gram(s) IV Intermittent every 8 hours    MEDICATIONS  (PRN):  dexAMETHasone  Injectable 4 milliGRAM(s) IV Push every 6 hours PRN Nausea  diphenhydrAMINE 25 milliGRAM(s) Oral every 6 hours PRN Pruritus  HYDROmorphone  Injectable 0.25 milliGRAM(s) IV Push every 10 minutes PRN Severe Pain (7 - 10)  lanolin Ointment 1 Application(s) Topical every 6 hours PRN Sore Nipples  magnesium hydroxide Suspension 30 milliLiter(s) Oral two times a day PRN Constipation  nalbuphine Injectable 2.5 milliGRAM(s) IV Push every 6 hours PRN Pruritus  naloxone Injectable 0.1 milliGRAM(s) IV Push every 3 minutes PRN For ANY of the following changes in patient status:  A. Breaths Per Minute LESS THAN 10, B. Oxygen saturation LESS THAN 90%, C. Sedation score of 6 for Stop After: 4 Times  ondansetron Injectable 4 milliGRAM(s) IV Push once PRN Nausea and/or Vomiting  ondansetron Injectable 4 milliGRAM(s) IV Push every 6 hours PRN Nausea  oxyCODONE    IR 5 milliGRAM(s) Oral every 3 hours PRN Moderate to Severe Pain (4-10)  oxyCODONE    IR 5 milliGRAM(s) Oral once PRN Moderate to Severe Pain (4-10)  oxyCODONE    IR 5 milliGRAM(s) Oral every 3 hours PRN Moderate Pain (4 - 6)  oxyCODONE    IR 10 milliGRAM(s) Oral every 3 hours PRN Severe Pain (7 - 10)  simethicone 80 milliGRAM(s) Chew every 4 hours PRN Gas      OBJECTIVE:    Sedation:        	[X] Alert	 [ ] Drowsy	[ ] Arousable      [ ] Asleep       [ ] Unresponsive    Side Effects:	[X] None 	[ ] Nausea	[ ] Vomiting         [ ] Pruritus  		[ ] Weakness            [ ] Numbness	          [ ] Other:    Vital Signs Last 24 Hrs  T(C): 36.4 (10 Abiel 2025 09:02), Max: 38.5 (09 Jan 2025 21:07)  T(F): 97.5 (10 Abiel 2025 09:02), Max: 101.3 (09 Jan 2025 21:07)  HR: 78 (10 Abiel 2025 09:02) (60 - 129)  BP: 95/65 (10 Abiel 2025 09:02) (95/65 - 142/66)  BP(mean): 80 (10 Abiel 2025 01:25) (76 - 95)  RR: 18 (10 Abiel 2025 09:02) (17 - 18)  SpO2: 98% (10 Abiel 2025 09:02) (81% - 100%)    Parameters below as of 10 Abiel 2025 09:02  Patient On (Oxygen Delivery Method): room air        ASSESSMENT/ PLAN  [X] Patient to be transitioned to prn analgesics after 24 hours  [X] Pain management per primary service, pain service to sign off   [X]Documentation and Verification of current medications

## 2025-01-10 NOTE — OB RN DELIVERY SUMMARY - NSSELHIDDEN_OBGYN_ALL_OB_FT
[NS_DeliveryAttending1_OBGYN_ALL_OB_FT:[id number]],[NS_DeliveryAssist1_OBGYN_ALL_OB_FT:[id number]]

## 2025-01-10 NOTE — PROGRESS NOTE ADULT - ASSESSMENT
A/P:  29y  P1 now POD # 1 S/P primary   section, doing well    PMHx:  PAST MEDICAL & SURGICAL HISTORY:  No pertinent past medical history      No significant past surgical history        Current Issues:    Chorioamnionitis: continue Zosyn   Increase OOB  DVT ppx  wade removed at 6am. TOV at 2pm  Regular diet  AM H/H 10.  Routine Postpartum/Post-op care    Karin Zaragoza PA-C

## 2025-01-10 NOTE — OB RN DELIVERY SUMMARY - NS_GENERALBABYACOMMENTA_OBGYN_ALL_OB_FT
Skin to skin delayed due to  clinical indication requiring evaluation by Pediatric Provider on the heated warmer.

## 2025-01-11 RX ORDER — IBUPROFEN 200 MG
600 TABLET ORAL EVERY 6 HOURS
Refills: 0 | Status: DISCONTINUED | OUTPATIENT
Start: 2025-01-11 | End: 2025-01-12

## 2025-01-11 RX ORDER — POLYETHYLENE GLYCOL 3350 17 G/DOSE
17 POWDER (GRAM) ORAL ONCE
Refills: 0 | Status: COMPLETED | OUTPATIENT
Start: 2025-01-11 | End: 2025-01-11

## 2025-01-11 RX ADMIN — ACETAMINOPHEN 975 MILLIGRAM(S): 80 SOLUTION/ DROPS ORAL at 21:14

## 2025-01-11 RX ADMIN — Medication 17 GRAM(S): at 14:55

## 2025-01-11 RX ADMIN — Medication 600 MILLIGRAM(S): at 12:50

## 2025-01-11 RX ADMIN — HEPARIN SODIUM 5000 UNIT(S): 1000 INJECTION, SOLUTION INTRAVENOUS; SUBCUTANEOUS at 18:18

## 2025-01-11 RX ADMIN — Medication 600 MILLIGRAM(S): at 06:40

## 2025-01-11 RX ADMIN — ACETAMINOPHEN 975 MILLIGRAM(S): 80 SOLUTION/ DROPS ORAL at 03:30

## 2025-01-11 RX ADMIN — SIMETHICONE 80 MILLIGRAM(S): 125 CAPSULE, LIQUID FILLED ORAL at 11:50

## 2025-01-11 RX ADMIN — ACETAMINOPHEN 975 MILLIGRAM(S): 80 SOLUTION/ DROPS ORAL at 02:55

## 2025-01-11 RX ADMIN — Medication 600 MILLIGRAM(S): at 19:00

## 2025-01-11 RX ADMIN — KETOROLAC TROMETHAMINE 30 MILLIGRAM(S): 30 INJECTION INTRAMUSCULAR; INTRAVENOUS at 01:00

## 2025-01-11 RX ADMIN — ACETAMINOPHEN 975 MILLIGRAM(S): 80 SOLUTION/ DROPS ORAL at 09:40

## 2025-01-11 RX ADMIN — ACETAMINOPHEN 975 MILLIGRAM(S): 80 SOLUTION/ DROPS ORAL at 15:55

## 2025-01-11 RX ADMIN — ACETAMINOPHEN 975 MILLIGRAM(S): 80 SOLUTION/ DROPS ORAL at 08:40

## 2025-01-11 RX ADMIN — Medication 600 MILLIGRAM(S): at 06:10

## 2025-01-11 RX ADMIN — ACETAMINOPHEN 975 MILLIGRAM(S): 80 SOLUTION/ DROPS ORAL at 21:45

## 2025-01-11 RX ADMIN — KETOROLAC TROMETHAMINE 30 MILLIGRAM(S): 30 INJECTION INTRAMUSCULAR; INTRAVENOUS at 00:24

## 2025-01-11 RX ADMIN — HEPARIN SODIUM 5000 UNIT(S): 1000 INJECTION, SOLUTION INTRAVENOUS; SUBCUTANEOUS at 06:10

## 2025-01-11 RX ADMIN — Medication 600 MILLIGRAM(S): at 11:50

## 2025-01-11 RX ADMIN — Medication 600 MILLIGRAM(S): at 18:15

## 2025-01-11 RX ADMIN — ACETAMINOPHEN 975 MILLIGRAM(S): 80 SOLUTION/ DROPS ORAL at 14:55

## 2025-01-11 NOTE — PROGRESS NOTE ADULT - SUBJECTIVE AND OBJECTIVE BOX
POD#2  Patient seen and examined at bedside, no acute overnight events. No acute complaints, pain well controlled.  Patient is ambulating and tolerating regular diet. Voided spontaneously. Pt is formula and breast feeding her baby.    Vital Signs Last 24 Hours  T(C): 36.3 (01-11-25 @ 05:25), Max: 37 (01-10-25 @ 17:41)  HR: 66 (01-11-25 @ 05:25) (60 - 80)  BP: 94/60 (01-11-25 @ 05:25) (94/60 - 122/78)  RR: 18 (01-11-25 @ 05:25) (17 - 18)  SpO2: 98% (01-11-25 @ 05:25) (98% - 99%)    I&O's Summary    10 Abiel 2025 07:01  -  11 Jan 2025 07:00  --------------------------------------------------------  IN: 0 mL / OUT: 720 mL / NET: -720 mL        Physical Exam:  General: NAD  Abdomen: Soft, non-tender, non-distended, fundus firm  Incision: Pfannenstiel incision CDI, subcuticular suture closure  Pelvic: Lochia wnl  Ext: NTBL  Labs:  Blood type: O Positive  Rubella IgG: RPR: Negative                          10.4[L]   23.09[H] >-----------< 124[L]    ( 01-10 @ 06:59 )             31.8[L]                        12.6   8.13 >-----------< 167    ( 01-08 @ 19:29 )             37.7                  MEDICATIONS  (STANDING):  acetaminophen     Tablet .. 975 milliGRAM(s) Oral <User Schedule>  diphtheria/tetanus/pertussis (acellular) Vaccine (Adacel) 0.5 milliLiter(s) IntraMuscular once  heparin   Injectable 5000 Unit(s) SubCutaneous every 12 hours  ibuprofen  Tablet. 600 milliGRAM(s) Oral every 6 hours  lactated ringers. 1000 milliLiter(s) (125 mL/Hr) IV Continuous <Continuous>  oxytocin Infusion 167 milliUNIT(s)/Min (167 mL/Hr) IV Continuous <Continuous>  oxytocin Infusion 42 milliUNIT(s)/Min (42 mL/Hr) IV Continuous <Continuous>  oxytocin Infusion. 4 milliUNIT(s)/Min (4 mL/Hr) IV Continuous <Continuous>  polyethylene glycol 3350 17 Gram(s) Oral once    MEDICATIONS  (PRN):  diphenhydrAMINE 25 milliGRAM(s) Oral every 6 hours PRN Pruritus  HYDROmorphone  Injectable 0.25 milliGRAM(s) IV Push every 10 minutes PRN Severe Pain (7 - 10)  lanolin Ointment 1 Application(s) Topical every 6 hours PRN Sore Nipples  magnesium hydroxide Suspension 30 milliLiter(s) Oral two times a day PRN Constipation  ondansetron Injectable 4 milliGRAM(s) IV Push once PRN Nausea and/or Vomiting  oxyCODONE    IR 5 milliGRAM(s) Oral every 3 hours PRN Moderate to Severe Pain (4-10)  oxyCODONE    IR 5 milliGRAM(s) Oral once PRN Moderate to Severe Pain (4-10)  simethicone 80 milliGRAM(s) Chew every 4 hours PRN Gas

## 2025-01-11 NOTE — PROGRESS NOTE ADULT - ASSESSMENT
29 POD#2 from PCS for cat II/chorio doing well after delivery    - Continue with po analgesia  - Increase ambulation  - Continue regular diet  - d/c IV fluids  - Likely discharge tomorrow AM    Keyla Ambriz

## 2025-01-12 VITALS
RESPIRATION RATE: 18 BRPM | OXYGEN SATURATION: 98 % | SYSTOLIC BLOOD PRESSURE: 113 MMHG | TEMPERATURE: 98 F | DIASTOLIC BLOOD PRESSURE: 66 MMHG | HEART RATE: 69 BPM

## 2025-01-12 PROCEDURE — 59025 FETAL NON-STRESS TEST: CPT

## 2025-01-12 PROCEDURE — 86850 RBC ANTIBODY SCREEN: CPT

## 2025-01-12 PROCEDURE — 86780 TREPONEMA PALLIDUM: CPT

## 2025-01-12 PROCEDURE — C1765: CPT

## 2025-01-12 PROCEDURE — 86900 BLOOD TYPING SEROLOGIC ABO: CPT

## 2025-01-12 PROCEDURE — 59050 FETAL MONITOR W/REPORT: CPT

## 2025-01-12 PROCEDURE — 85025 COMPLETE CBC W/AUTO DIFF WBC: CPT

## 2025-01-12 PROCEDURE — 86901 BLOOD TYPING SEROLOGIC RH(D): CPT

## 2025-01-12 RX ORDER — ACETAMINOPHEN 80 MG/.8ML
3 SOLUTION/ DROPS ORAL
Qty: 0 | Refills: 0 | DISCHARGE
Start: 2025-01-12

## 2025-01-12 RX ORDER — IBUPROFEN 200 MG
1 TABLET ORAL
Qty: 0 | Refills: 0 | DISCHARGE
Start: 2025-01-12

## 2025-01-12 RX ADMIN — ACETAMINOPHEN 975 MILLIGRAM(S): 80 SOLUTION/ DROPS ORAL at 03:25

## 2025-01-12 RX ADMIN — Medication 600 MILLIGRAM(S): at 01:00

## 2025-01-12 RX ADMIN — ACETAMINOPHEN 975 MILLIGRAM(S): 80 SOLUTION/ DROPS ORAL at 02:52

## 2025-01-12 RX ADMIN — Medication 600 MILLIGRAM(S): at 05:30

## 2025-01-12 RX ADMIN — HEPARIN SODIUM 5000 UNIT(S): 1000 INJECTION, SOLUTION INTRAVENOUS; SUBCUTANEOUS at 05:30

## 2025-01-12 RX ADMIN — Medication 600 MILLIGRAM(S): at 00:27

## 2025-01-12 NOTE — PROGRESS NOTE ADULT - SUBJECTIVE AND OBJECTIVE BOX
OB Postpartum Note:  Delivery, POD#3    S: 28yo POD#3 s/p pLTCS (arrest of dilation at 9cm). Delivery c/b chroiamnionitis. The patient feels well.  Pain is well controlled. She is tolerating a regular diet and passing flatus. She is voiding spontaneously, and ambulating without difficulty. Denies CP/SOB. Denies lightheadedness/dizziness. Denies N/V. Denies fevers, chills    O:  Vitals:  Vital Signs Last 24 Hrs  T(C): 36.4 (2025 05:18), Max: 36.6 (2025 13:02)  T(F): 97.6 (2025 05:18), Max: 97.9 (2025 13:02)  HR: 69 (2025 05:18) (69 - 81)  BP: 113/66 (2025 05:18) (97/66 - 113/66)  BP(mean): --  RR: 18 (2025 05:18) (18 - 18)  SpO2: 98% (2025 05:18) (98% - 99%)    Parameters below as of 2025 05:18  Patient On (Oxygen Delivery Method): room air        MEDICATIONS  (STANDING):  acetaminophen     Tablet .. 975 milliGRAM(s) Oral <User Schedule>  diphtheria/tetanus/pertussis (acellular) Vaccine (Adacel) 0.5 milliLiter(s) IntraMuscular once  heparin   Injectable 5000 Unit(s) SubCutaneous every 12 hours  ibuprofen  Tablet. 600 milliGRAM(s) Oral every 6 hours  lactated ringers. 1000 milliLiter(s) (125 mL/Hr) IV Continuous <Continuous>  oxytocin Infusion 167 milliUNIT(s)/Min (167 mL/Hr) IV Continuous <Continuous>  oxytocin Infusion 42 milliUNIT(s)/Min (42 mL/Hr) IV Continuous <Continuous>  oxytocin Infusion. 4 milliUNIT(s)/Min (4 mL/Hr) IV Continuous <Continuous>    MEDICATIONS  (PRN):  diphenhydrAMINE 25 milliGRAM(s) Oral every 6 hours PRN Pruritus  HYDROmorphone  Injectable 0.25 milliGRAM(s) IV Push every 10 minutes PRN Severe Pain (7 - 10)  lanolin Ointment 1 Application(s) Topical every 6 hours PRN Sore Nipples  magnesium hydroxide Suspension 30 milliLiter(s) Oral two times a day PRN Constipation  ondansetron Injectable 4 milliGRAM(s) IV Push once PRN Nausea and/or Vomiting  oxyCODONE    IR 5 milliGRAM(s) Oral every 3 hours PRN Moderate to Severe Pain (4-10)  oxyCODONE    IR 5 milliGRAM(s) Oral once PRN Moderate to Severe Pain (4-10)  simethicone 80 milliGRAM(s) Chew every 4 hours PRN Gas      LABS:  Blood type: O Positive  Rubella IgG: RPR: Negative                          10.4[L]   23.09[H] >-----------< 124[L]    ( 01-10 @ 06:59 )             31.8[L]      Physical exam:  Gen: NAD  Abdomen: Soft, nontender, no distension , firm uterine fundus at umbilicus.  Incision: Clean, dry, and intact   Pelvic: Normal lochia noted  Ext: No calf tenderness     OB Postpartum Note:  Delivery, POD#3    S: 28yo POD#3 s/p pLTCS (cat 2 tracing). Delivery c/b chroiamnionitis. The patient feels well.  Pain is well controlled. She is tolerating a regular diet and passing flatus. She is voiding spontaneously, and ambulating without difficulty. Denies CP/SOB. Denies lightheadedness/dizziness. Denies N/V. Denies fevers, chills    O:  Vitals:  Vital Signs Last 24 Hrs  T(C): 36.4 (2025 05:18), Max: 36.6 (2025 13:02)  T(F): 97.6 (2025 05:18), Max: 97.9 (2025 13:02)  HR: 69 (2025 05:18) (69 - 81)  BP: 113/66 (2025 05:18) (97/66 - 113/66)  BP(mean): --  RR: 18 (2025 05:18) (18 - 18)  SpO2: 98% (2025 05:18) (98% - 99%)    Parameters below as of 2025 05:18  Patient On (Oxygen Delivery Method): room air        MEDICATIONS  (STANDING):  acetaminophen     Tablet .. 975 milliGRAM(s) Oral <User Schedule>  diphtheria/tetanus/pertussis (acellular) Vaccine (Adacel) 0.5 milliLiter(s) IntraMuscular once  heparin   Injectable 5000 Unit(s) SubCutaneous every 12 hours  ibuprofen  Tablet. 600 milliGRAM(s) Oral every 6 hours  lactated ringers. 1000 milliLiter(s) (125 mL/Hr) IV Continuous <Continuous>  oxytocin Infusion 167 milliUNIT(s)/Min (167 mL/Hr) IV Continuous <Continuous>  oxytocin Infusion 42 milliUNIT(s)/Min (42 mL/Hr) IV Continuous <Continuous>  oxytocin Infusion. 4 milliUNIT(s)/Min (4 mL/Hr) IV Continuous <Continuous>    MEDICATIONS  (PRN):  diphenhydrAMINE 25 milliGRAM(s) Oral every 6 hours PRN Pruritus  HYDROmorphone  Injectable 0.25 milliGRAM(s) IV Push every 10 minutes PRN Severe Pain (7 - 10)  lanolin Ointment 1 Application(s) Topical every 6 hours PRN Sore Nipples  magnesium hydroxide Suspension 30 milliLiter(s) Oral two times a day PRN Constipation  ondansetron Injectable 4 milliGRAM(s) IV Push once PRN Nausea and/or Vomiting  oxyCODONE    IR 5 milliGRAM(s) Oral every 3 hours PRN Moderate to Severe Pain (4-10)  oxyCODONE    IR 5 milliGRAM(s) Oral once PRN Moderate to Severe Pain (4-10)  simethicone 80 milliGRAM(s) Chew every 4 hours PRN Gas      LABS:  Blood type: O Positive  Rubella IgG: RPR: Negative                          10.4[L]   23.09[H] >-----------< 124[L]    ( 01-10 @ 06:59 )             31.8[L]      Physical exam:  Gen: NAD  Abdomen: Soft, nontender, no distension , firm uterine fundus at umbilicus.  Incision: Clean, dry, and intact   Pelvic: Normal lochia noted  Ext: No calf tenderness

## 2025-01-12 NOTE — DISCHARGE NOTE OB - CARE PLAN
Principal Discharge DX:	 delivery delivered  Assessment and plan of treatment:	Recovery after . Follow up in the office in 1 week for an incision check and in 6 weeks for a postpartum exam.   1

## 2025-01-12 NOTE — DISCHARGE NOTE OB - CARE PROVIDER_API CALL
Michelle Keller  Obstetrics and Gynecology  [address] [place] [address], [facility] [address]  [place] [address]  Phone: [phone]  Fax: [phone]  Follow Up Time:

## 2025-01-12 NOTE — DISCHARGE NOTE OB - HOSPITAL COURSE
Patient underwent an elective induction of labor, she was diagnosed with intraamniotic infection during the active stage of labor and developed a cat 2 tracing, for which she underwent a primary  section. She recovered uneventfully and did not have further fevers. She was discharged home in stable condition on POD#3.

## 2025-01-12 NOTE — PROGRESS NOTE ADULT - ASSESSMENT
A/P: 30yo POD#3 s/p pLTCS 2/2 arrest of dilation at 9cm. Intrapartum course c/b chorioamnionitis  Patient is stable and is doing well post-operatively.    #chorioamnionitis  -Txam: 38.5 on 1/9 at 9p  -WBC: 8->23  -s/p Zosyn (1/9-1/10)  -Continue to trend fever curve    #postpartum  - Continue motrin, tylenol, oxycodone PRN for pain control.  - Increase ambulation  - Continue regular diet  - Discharge planning    E Adriel PGY4 A/P: 30yo POD#3 s/p pLTCS. Intrapartum course c/b chorioamnionitis  Patient is stable and is doing well post-operatively.    #chorioamnionitis  -Txam: 38.5 on 1/9 at 9p  -WBC: 8->23  -s/p Zosyn (1/9-1/10)  -Continue to trend fever curve    #postpartum  - Continue motrin, tylenol, oxycodone PRN for pain control.  - Increase ambulation  - Continue regular diet  - Discharge planning    E Adriel PGY4

## 2025-01-12 NOTE — DISCHARGE NOTE OB - PATIENT PORTAL LINK FT
You can access the FollowMyHealth Patient Portal offered by [facility] by registering at the following website: [email or web address]. By joining [facility]’s FollowMyHealth portal, you will also be able to view your health information using other applications (apps) compatible with our system.

## 2025-01-12 NOTE — DISCHARGE NOTE OB - FINANCIAL ASSISTANCE
[facility] provides services at a reduced cost to those who are determined to be eligible through [facility]’s financial assistance program. Information regarding [facility]’s financial assistance program can be found by going to [email or web address] or by calling [phone].

## 2025-01-12 NOTE — DISCHARGE NOTE OB - MEDICATION SUMMARY - MEDICATIONS TO TAKE
I will START or STAY ON the medications listed below when I get home from the hospital:    ibuprofen 600 mg oral tablet  -- 1 tab(s) by mouth every 6 hours  -- Indication: For Pain    acetaminophen 325 mg oral tablet  -- 3 tab(s) by mouth 4 times a day  -- Indication: For Pain    Prenatal Multivitamins Folic Acid 0.4 mg oral tablet  -- 1 tab(s) orally  -- Indication: For vitamin

## 2025-01-13 ENCOUNTER — APPOINTMENT (OUTPATIENT)
Dept: OBGYN | Facility: CLINIC | Age: 30
End: 2025-01-13

## 2025-01-13 PROBLEM — Z78.9 OTHER SPECIFIED HEALTH STATUS: Chronic | Status: ACTIVE | Noted: 2025-01-08

## 2025-01-17 ENCOUNTER — APPOINTMENT (OUTPATIENT)
Dept: OBGYN | Facility: CLINIC | Age: 30
End: 2025-01-17
Payer: COMMERCIAL

## 2025-01-17 PROCEDURE — 99024 POSTOP FOLLOW-UP VISIT: CPT

## 2025-02-21 ENCOUNTER — APPOINTMENT (OUTPATIENT)
Dept: OBGYN | Facility: CLINIC | Age: 30
End: 2025-02-21
Payer: COMMERCIAL

## 2025-02-21 PROCEDURE — 0503F POSTPARTUM CARE VISIT: CPT
